# Patient Record
Sex: MALE | Race: OTHER | URBAN - METROPOLITAN AREA
[De-identification: names, ages, dates, MRNs, and addresses within clinical notes are randomized per-mention and may not be internally consistent; named-entity substitution may affect disease eponyms.]

---

## 2017-10-07 ENCOUNTER — EMERGENCY (EMERGENCY)
Facility: HOSPITAL | Age: 32
LOS: 1 days | Discharge: HOME | End: 2017-10-07
Admitting: EMERGENCY MEDICINE
Payer: SELF-PAY

## 2017-10-07 VITALS
SYSTOLIC BLOOD PRESSURE: 121 MMHG | WEIGHT: 136.69 LBS | TEMPERATURE: 98 F | DIASTOLIC BLOOD PRESSURE: 82 MMHG | OXYGEN SATURATION: 99 % | HEART RATE: 86 BPM | RESPIRATION RATE: 16 BRPM
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
## 2017-10-07 DIAGNOSIS — S05.12XA CONTUSION OF EYEBALL AND ORBITAL TISSUES, LEFT EYE, INITIAL ENCOUNTER: ICD-10-CM

## 2017-10-07 PROCEDURE — 70486 CT MAXILLOFACIAL W/O DYE: CPT | Mod: 26

## 2017-10-07 PROCEDURE — 70486 CT MAXILLOFACIAL W/O DYE: CPT

## 2017-10-07 PROCEDURE — 99284 EMERGENCY DEPT VISIT MOD MDM: CPT | Mod: 25

## 2017-10-07 PROCEDURE — 99053 MED SERV 10PM-8AM 24 HR FAC: CPT
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
## 2017-10-07 NOTE — ED PROVIDER NOTE - PROGRESS NOTE DETAILS
attending read of ct shows no fracture of C2, no point tenderness on exam, will not get dedicated CT c-spine, of note attending radiologist concerned for a thin area of bone which could cause CSF leak if has occult fracture, will discuss with OMFS discussed CT read with Dr. Young [facility] who will f/u with pt this week, pt to return to ED if has any discharge from nose, if develops headache, fever, vomiting, double vision
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
